# Patient Record
Sex: FEMALE | Race: BLACK OR AFRICAN AMERICAN | NOT HISPANIC OR LATINO | Employment: UNEMPLOYED | URBAN - METROPOLITAN AREA
[De-identification: names, ages, dates, MRNs, and addresses within clinical notes are randomized per-mention and may not be internally consistent; named-entity substitution may affect disease eponyms.]

---

## 2017-01-24 ENCOUNTER — HISTORICAL (OUTPATIENT)
Dept: RADIOLOGY | Facility: HOSPITAL | Age: 32
End: 2017-01-24

## 2018-05-22 ENCOUNTER — HISTORICAL (OUTPATIENT)
Dept: ADMINISTRATIVE | Facility: HOSPITAL | Age: 33
End: 2018-05-22

## 2018-05-23 ENCOUNTER — HISTORICAL (OUTPATIENT)
Dept: ADMINISTRATIVE | Facility: HOSPITAL | Age: 33
End: 2018-05-23

## 2018-10-07 ENCOUNTER — HOSPITAL ENCOUNTER (OUTPATIENT)
Dept: OBSTETRICS AND GYNECOLOGY | Facility: HOSPITAL | Age: 33
End: 2018-10-08
Attending: OBSTETRICS & GYNECOLOGY | Admitting: OBSTETRICS & GYNECOLOGY

## 2018-10-07 LAB — FINAL CULTURE: NORMAL

## 2018-10-29 ENCOUNTER — HISTORICAL (OUTPATIENT)
Dept: LAB | Facility: HOSPITAL | Age: 33
End: 2018-10-29

## 2018-11-28 ENCOUNTER — HOSPITAL ENCOUNTER (OUTPATIENT)
Dept: OBSTETRICS AND GYNECOLOGY | Facility: HOSPITAL | Age: 33
End: 2018-11-28
Attending: OBSTETRICS & GYNECOLOGY | Admitting: OBSTETRICS & GYNECOLOGY

## 2018-12-04 ENCOUNTER — HISTORICAL (OUTPATIENT)
Dept: LAB | Facility: HOSPITAL | Age: 33
End: 2018-12-04

## 2018-12-13 ENCOUNTER — HISTORICAL (OUTPATIENT)
Dept: LAB | Facility: HOSPITAL | Age: 33
End: 2018-12-13

## 2018-12-27 ENCOUNTER — HISTORICAL (OUTPATIENT)
Dept: LAB | Facility: HOSPITAL | Age: 33
End: 2018-12-27

## 2018-12-29 LAB — FINAL CULTURE: NORMAL

## 2019-02-14 ENCOUNTER — HOSPITAL ENCOUNTER (OUTPATIENT)
Dept: OBSTETRICS AND GYNECOLOGY | Facility: HOSPITAL | Age: 34
End: 2019-02-14
Attending: INTERNAL MEDICINE | Admitting: INTERNAL MEDICINE

## 2019-02-14 LAB
ABS NEUT (OLG): 10.4 X10(3)/MCL (ref 2.1–9.2)
ALBUMIN SERPL-MCNC: 2.5 GM/DL (ref 3.4–5)
ALBUMIN/GLOB SERPL: 0.6 {RATIO}
ALP SERPL-CCNC: 132 UNIT/L (ref 38–126)
ALT SERPL-CCNC: 25 UNIT/L (ref 12–78)
APPEARANCE, UA: CLEAR
AST SERPL-CCNC: 20 UNIT/L (ref 15–37)
BACTERIA #/AREA URNS AUTO: ABNORMAL /HPF
BASOPHILS # BLD AUTO: 0 X10(3)/MCL (ref 0–0.2)
BASOPHILS NFR BLD AUTO: 0 %
BILIRUB SERPL-MCNC: 0.2 MG/DL (ref 0.2–1)
BILIRUB UR QL STRIP: NEGATIVE
BILIRUBIN DIRECT+TOT PNL SERPL-MCNC: 0.1 MG/DL (ref 0–0.2)
BILIRUBIN DIRECT+TOT PNL SERPL-MCNC: 0.1 MG/DL (ref 0–0.8)
BUN SERPL-MCNC: 6 MG/DL (ref 7–18)
CALCIUM SERPL-MCNC: 8.2 MG/DL (ref 8.5–10.1)
CHLORIDE SERPL-SCNC: 105 MMOL/L (ref 98–107)
CO2 SERPL-SCNC: 20 MMOL/L (ref 21–32)
COLOR UR: ABNORMAL
CREAT SERPL-MCNC: 0.58 MG/DL (ref 0.55–1.02)
EOSINOPHIL # BLD AUTO: 0.1 X10(3)/MCL (ref 0–0.9)
EOSINOPHIL NFR BLD AUTO: 1 %
ERYTHROCYTE [DISTWIDTH] IN BLOOD BY AUTOMATED COUNT: 13.7 % (ref 11.5–17)
GLOBULIN SER-MCNC: 4.3 GM/DL (ref 2.4–3.5)
GLUCOSE (UA): NEGATIVE
GLUCOSE SERPL-MCNC: 78 MG/DL (ref 74–106)
HCT VFR BLD AUTO: 33.4 % (ref 37–47)
HGB BLD-MCNC: 10.9 GM/DL (ref 12–16)
HGB UR QL STRIP: NEGATIVE
KETONES UR QL STRIP: ABNORMAL
LDH SERPL-CCNC: 153 UNIT/L (ref 84–246)
LEUKOCYTE ESTERASE UR QL STRIP: NEGATIVE
LYMPHOCYTES # BLD AUTO: 1.5 X10(3)/MCL (ref 0.6–4.6)
LYMPHOCYTES NFR BLD AUTO: 11 %
MCH RBC QN AUTO: 30.8 PG (ref 27–31)
MCHC RBC AUTO-ENTMCNC: 32.6 GM/DL (ref 33–36)
MCV RBC AUTO: 94.4 FL (ref 80–94)
MONOCYTES # BLD AUTO: 0.9 X10(3)/MCL (ref 0.1–1.3)
MONOCYTES NFR BLD AUTO: 7 %
NEUTROPHILS # BLD AUTO: 10.4 X10(3)/MCL (ref 2.1–9.2)
NEUTROPHILS NFR BLD AUTO: 79 %
NITRITE UR QL STRIP.AUTO: NEGATIVE
PH UR STRIP: 6 [PH] (ref 5–9)
PLATELET # BLD AUTO: 167 X10(3)/MCL (ref 130–400)
PMV BLD AUTO: 10.2 FL (ref 9.4–12.4)
POTASSIUM SERPL-SCNC: 3.7 MMOL/L (ref 3.5–5.1)
PROT SERPL-MCNC: 6.8 GM/DL (ref 6.4–8.2)
PROT UR QL STRIP: ABNORMAL
RBC # BLD AUTO: 3.54 X10(6)/MCL (ref 4.2–5.4)
RBC #/AREA URNS HPF: ABNORMAL /[HPF]
SODIUM SERPL-SCNC: 134 MMOL/L (ref 136–145)
SP GR UR STRIP: 1.03 (ref 1–1.03)
SQUAMOUS EPITHELIAL, UA: ABNORMAL
URATE SERPL-MCNC: 3.2 MG/DL (ref 2.6–7.2)
UROBILINOGEN UR STRIP-ACNC: 1
WBC # SPEC AUTO: 13.2 X10(3)/MCL (ref 4.5–11.5)
WBC #/AREA URNS AUTO: 5 /HPF (ref 0–3)

## 2019-02-18 ENCOUNTER — HISTORICAL (OUTPATIENT)
Dept: LAB | Facility: HOSPITAL | Age: 34
End: 2019-02-18

## 2019-02-18 LAB
CREAT SERPL-MCNC: 0.65 MG/DL (ref 0.7–1.3)
CREAT SERPL-MCNC: 0.65 MG/DL (ref 0.7–1.3)
CREAT UR-MCNC: 102.7 MG/DL
PROT 24H UR-MCNC: 228 MG/24HR (ref 60–100)

## 2019-10-10 ENCOUNTER — HISTORICAL (OUTPATIENT)
Dept: CARDIOLOGY | Facility: HOSPITAL | Age: 34
End: 2019-10-10

## 2022-03-15 ENCOUNTER — HISTORICAL (OUTPATIENT)
Dept: RADIOLOGY | Facility: HOSPITAL | Age: 37
End: 2022-03-15

## 2022-03-15 ENCOUNTER — HISTORICAL (OUTPATIENT)
Dept: ADMINISTRATIVE | Facility: HOSPITAL | Age: 37
End: 2022-03-15

## 2022-04-09 ENCOUNTER — HISTORICAL (OUTPATIENT)
Dept: ADMINISTRATIVE | Facility: HOSPITAL | Age: 37
End: 2022-04-09

## 2022-04-25 VITALS — HEIGHT: 67 IN | WEIGHT: 174.81 LBS | BODY MASS INDEX: 27.44 KG/M2

## 2022-04-30 NOTE — H&P
HISTORY OF PRESENT ILLNESS:  Ms. Blackburn is a 33-year-old  7, para 3-0-3-3, with an intrauterine pregnancy at 12 weeks.  The patient's pregnancy is complicated by the patient stating that she decided she did not want to be pregnant in September.  She took Cytotec multiple times and states that she had some left over from previous miscarriage management.  She does not remember the physician who wrote her for the medicine or has the prescription bottle, but she states she took an undisclosed amount of Cytotec knowing that it could help end the pregnancy; she states she read on the Internet and from friends.  Currently, she arrives to [facility] today with diffuse abdominal pain where she is found to have a viable pregnancy at 12 weeks and 1 day with a fetal heart rate of 150 and a subchorionic hemorrhage.  The patient will be admitted for observation and pain management.  She denies vaginal bleeding at this time, leakage of fluid, fevers, chills, constipation, diarrhea.    OBSTETRICAL HISTORY:  Notable for 3 full-term vaginal deliveries, 3 miscarriages.  She states they were all complete on their own.    SOCIAL HISTORY:  She is single mom, 3 children.  Does not do illicit drugs or tobacco.    PAST MEDICAL HISTORY:  She denies.    ALLERGIES:  She is allergic to NSAIDs.    GYNECOLOGICAL HISTORY:  She states ovarian cysts history in the past.    MEDICATIONS:  She takes none.    REVIEW OF SYSTEMS:  The patient denies a headache, visual changes, chest pain, shortness of breath, nausea, vomiting, fever and chills.  She states she feels pregnant.  Reports abdominal pain and contractions and pressure.    PHYSICAL EXAMINATION:  VITAL SIGNS:  Reviewed.  Blood pressure is 102/65, initial was 89/54, pulse is in the 70s.  She is saturating 100% in room air.   GENERAL:  She is alert and oriented x3.   ABDOMEN:  Soft, nontender and nondistended.  No guarding.  No rebound.     EXTREMITIES:  Nontender.      Ultrasound shows a single live pregnancy at 12 weeks 1 day with a heart rate 150.  Subchorionic hemorrhage of 2.8 x 1 point, cm.  Possible placenta previa.  It is only 12 weeks, however.    LABORATORY DATA:  Reviewed.  CMP is within normal limits.  Her beta quant is 51,000.  Her H and H are 12.9 and 39.3.  Urine tox is positive for benzodiazepines in the past as well as opiates.  The patient states she never did drugs.    ASSESSMENT AND PLAN:  This is a 33-year-old  7, para 3-0-3-3, with an intrauterine pregnancy at 12 weeks.  The patient tried to take an abortifacient knowing it was an abortifacient early in the pregnancy.  She states at that time she did not want to be pregnant.  However, she understands that today her pregnancy is viable.  She states that today she is not sure if she wants to keep her pregnancy or not.  I explained to Ms. Blackburn that we do not perform abortions here at [facility] and that I do not even know where she would have to go.  I am sure she can Google it like she Googled her Cytotec information.  I also explained to her that if she thinks she wants to keep the baby, my recommendation at this time would be to put her on progesterone therapy.  We will get  consultation.  I also offered her pastoral care.  The patient verbalized understanding to this and states she would like a  to come talk to her and at this time, she would like to treat it like a viable pregnancy.  We will also get a U-tox for her history.  Her last Pap smear was in , performed by Dr. Baldwin [facility], it was normal.        ______________________________  MD ROGER Butt/MALENA  DD:  10/07/2018  Time:  02:36PM  DT:  10/07/2018  Time:  03:06PM  Job #:  [id number]    The H&P was reviewed, the patient was examined, and the following changes to the patients condition are  noted:  ______________________________________________________________________________  ______________________________________________________________________________  ______________________________________________________________________________  [  ] No changes to the patient's condition:      ______________________________                                             ___________________  PHYSICIAN SIGNATURE                                                             DATE/TIME

## 2022-04-30 NOTE — ED PROVIDER NOTES
Patient:   Linda Blackburn            MRN: [id number]            FIN: [id number]               Age:   33 years     Sex:  Female     :  1985   Associated Diagnoses:   Left upper quadrant pain; Subchorionic hematoma; Threatened    Author:   Beena Apple MD      Basic Information   Time seen: Date & time 10/7/2018 09:50:00.   History source: Patient, EMS.   Arrival mode: Ambulance.   History limitation: None.   Additional information: Chief Complaint from Nursing Triage Note : Chief Complaint   10/7/2018 9:48 CDT       Chief Complaint           reports vaginal spotting, blood clots, and abd pain. approx 2-3 months pregnant. hypotensive in route, bp 89/54. reports firmness to abdomen.  (Modified) .      History of Present Illness   The patient presents with vaginal bleeding.  The onset was just prior to arrival.  The course/duration of symptoms is constant.  Character of pain: sharp, location: diffuse degree: severe.  Bleeding: moderate passing clots.  Pregnancy Status : 7, Para: 3, 12 weeks LMP 2018.  The exacerbating factor is none.  The relieving factor is none.  Risk factors consist of Hx prior ectopic.  Prior episodes: ectopic pregnancy.  Therapy today: emergency medical services.  Associated symptoms: abdominal pain, denies nausea and denies vomiting.        Review of Systems   Constitutional symptoms:  No fever, no chills.    Skin symptoms:  No jaundice, no rash.    Eye symptoms:  Vision unchanged, No blurred vision,    Respiratory symptoms:  Orthopnea, no shortness of breath, no cough.    Cardiovascular symptoms:  Diaphoresis, no chest pain, no palpitations.    Gastrointestinal symptoms:  Abdominal pain, no nausea, no vomiting, no diarrhea, no constipation.    Genitourinary symptoms:  Vaginal bleeding, no dysuria, no hematuria.    Neurologic symptoms:  No headache, no dizziness.    Hematologic/Lymphatic symptoms:  Bleeding tendency negative,    Allergy/immunologic  symptoms:  No impaired immunity,              Additional review of systems information: All other systems reviewed and otherwise negative.      Health Status   Allergies:    Allergic Reactions (Selected)  Severity Not Documented  NSAIDs- No reactions were documented..   Medications:  (Selected)   Prescriptions  Prescribed  Zofran 4 mg oral tablet: 4 mg = 1 tab(s), Oral, q8hr, PRN PRN as needed for nausea/vomiting, # 15 tab(s), 0 Refill(s)  Documented Medications  Documented  ACETAMINOPHEN/COD ELIXIR HI:   ALPRAZOLAM 1 MG TABLET: 1 mg = 1 tab(s), Oral, TID  AMITRIPTYLINE HCL 25 MG TAB: 25 mg = 1 tab(s), Oral, qPM  AMOX/K CLAV  -125: = 1 tab(s), Oral, BID  AMOXICILLIN 500 MG CAPS: 500 mg = 1 cap(s), Oral, TID  APAP #3 TABLET:   BENZONATATE 100 MG CAPS:   CHLORHEXIDINE GLUC 0.12% RI:   CITALOPRAM HBR 40 MG TABLET: 40 mg = 1 tab(s), Oral, Daily  GUANFACINE 2MG TABLET: 2 mg = 1 tab(s), Oral, BID  HYDROCO/APAP TAB 5-325MG:    MG TABS:   LORATADINE 10 MG TABLET: 10 mg = 1 tab(s), Oral, Daily  SUMATRIPTAN SUCC 100 MG TAB:   TIZANIDINE 4MG TABLET: 4 mg = 1 tab(s), Oral, BID  TRAMADOL HCL 50 MG TABLET: 50 mg = 1 tab(s), Oral, BID  TRAZODONE 150MG TABLET MPC: 150 mg = 1 tab(s), Oral, qPM  ZOLPIDEM TART 5MG TABLET: 5 mg = 1 tab(s), Oral, qPM.      Past Medical/ Family/ Social History   Medical history:    Active  Scoliosis ([id number])  Chronic back pain ([id number])  Anxiety ([id number])  Depression ([id number])  Migraine (59175475)  Bipolar (473029273)  Anemia (925080854)  Resolved  Preeclampsia ([id number]):  Resolved., Reviewed as documented in chart.   Surgical history:    none., Reviewed as documented in chart.   Family history:    Hypertension.  Father  Mother  , Reviewed as documented in chart.   Social history:    Social & Psychosocial Habits    Alcohol  05/02/2014 Risk Assessment: Denies Alcohol Use    11/15/2016  Use: Never    Employment/School  06/24/2016  Status:  Employed    Home/Environment  11/29/2017  Lives with: Children    Living situation: Home/Independent    Substance Abuse  05/02/2014 Risk Assessment: Denies Substance Abuse    06/01/2016  Use: Never    Tobacco  05/29/2012 Risk Assessment: Denies Tobacco Use    05/15/2016  Use: Never smoker  , Reviewed as documented in chart.   Problem list:    Active Problems (7)  Anemia   Anxiety   Bipolar   Chronic back pain   Depression   Migraine   Scoliosis   .      Physical Examination               Vital Signs   Vital Signs   10/7/2018 9:48 CDT       Temperature Oral          36.5 DegC                             Temperature Oral (calculated)             97.70 DegF                             Peripheral Pulse Rate     70 bpm                             Respiratory Rate          18 br/min                             SpO2                      99 %                             Oxygen Therapy            Room air                             Systolic Blood Pressure   89 mmHg  LOW                             Diastolic Blood Pressure  54 mmHg  LOW  .      Vital Signs (last 24 hrs)_____  Last Charted___________  Temp Oral     36.5 DegC  (OCT 07 09:48)  Heart Rate Peripheral   70 bpm  (OCT 07 09:48)  Resp Rate         18 br/min  (OCT 07 09:48)  SBP      L 89mmHg  (OCT 07 09:48)  DBP      L 54mmHg  (OCT 07 09:48)  SpO2      99 %  (OCT 07 09:48)  .   Measurements   10/7/2018 9:48 CDT       Weight Dosing             83 kg                             Weight Measured and Calculated in Lbs     182.98 lb                             Weight Estimated          83 kg                             Height/Length Dosing      165 cm                             Height/Length Estimated   165 cm                             Body Mass Index Estimated 30.49 kg/m2  .   Basic Oxygen Information   10/7/2018 9:48 CDT       SpO2                      99 %                             Oxygen Therapy            Room air  .   General:  Alert, no acute distress.    Skin:   Warm, dry, normal for ethnicity.    Head:  Normocephalic, atraumatic.    Neck:  Supple, trachea midline.    Eye:  Normal conjunctiva.   Ears, nose, mouth and throat:  Oral mucosa moist.   Cardiovascular:  Regular rate and rhythm, Normal peripheral perfusion, No edema.    Respiratory:  Respirations are non-labored.   Gastrointestinal:  Soft, Non distended, Normal bowel sounds, Tenderness: Moderate, generalized, Guarding: Negative.    Genitourinary:  External genitalia: Normal, Bimanual exam: Cervix closed, small amount of bleeding, no clots, no tissue.    Neurological:  Alert and oriented to person, place, time, and situation, No focal neurological deficit observed.    Psychiatric:  Cooperative.      Medical Decision Making   Rationale:  Patient with vaginal bleeding in the context of home positive pregnancy tests.  Had not seen any ObGyn provider for this pregnancy yet.    Exam is rather unremarkable; however, ultrasound demonstrates a viable IUP with probable subchorionic hemorrhage, possible placenta previa.  Upon further discussion with the patient, it appears that she had taken home left over Cytotec from a prior miscarriage in an attempt to abort this pregnancy without medical guidance on this instance.  She is still complaining of severe pain requiring parenteral narcotics.  I discussed with ObGyn on call regarding admission for observation, serial H&H overnight and repeat abdominal exams.  Dr. Schmid has kindly agreed to admit the patient..   Documents reviewed:  Emergency department nurses' notes.   Orders  Launch Order Profile (Selected)   Inpatient Orders  InProcess (In Process)  CMP: STAT collect, 10/07/18 10:21:26 CDT, BLOOD, Collected, Stop date 10/07/18 10:21:00 CDT, Lab Collect  Ordered  Discharge Planning Ongoing Assessment: 10/10/18 9:00:00 CDT, q3day  Ordered (Collected)  POC ISTAT Chem8 Request:: BLOOD, STAT collect, Collected, 10/07/18 10:21:26 CDT, Stop date 10/07/18 10:21:00 CDT, Lab Collect,  Print Label By Order Location  Ordered (Dispatched)  Pregnancy Test: Stat collect, 10/07/18 10:47:00 CDT, Blood, Stop date 10/07/18 10:47:00 CDT, Lab Collect, Print Label By Order Location, 10/07/18 10:47:00 CDT  Ordered (Exam Completed)  US OB 1st Trimester: Stat, 10/07/18 10:29:00 CDT, Vaginal Bleeding, None, Stretcher, Rad Type, Schedule this test, 10/07/18 10:29:00 CDT  Ordered (In-Lab)  hCG Quantitative: STAT collect, 10/07/18 10:24:26 CDT, BLOOD, Collected, Stop date 10/07/18 10:24:00 CDT, Lab Collect  Canceled  Type and rh: STAT collect, 10/07/18 10:21:26 CDT, BLOOD, Collected, Stop date 10/07/18 10:21:00 CDT, Lab Collect  Completed  Automated Diff: STAT collect, 10/07/18 10:21:00 CDT, Blood, Collected, Once, Stop date 10/07/18 10:21:00 CDT, Lab Collect, Print Label By Order Location, 10/07/18 10:03:00 CDT  CBC w/ Auto Diff: STAT collect, 10/07/18 10:21:26 CDT, BLOOD, Collected, Stop date 10/07/18 10:21:00 CDT, Lab Collect  [id number]: 2,000 mL, 2,000 mL, IV, start date 10/07/18 10:29:00 CDT, stop date 10/07/18 10:29:00 CDT, STAT  PT: STAT collect, 10/07/18 10:21:26 CDT, BLOOD, Collected, Stop date 10/07/18 10:21:00 CDT, Lab Collect  PTT: STAT collect, 10/07/18 10:21:26 CDT, BLOOD, Collected, Stop date 10/07/18 10:21:00 CDT, Lab Collect  Zofran 2 mg/mL injectable solution: 4 mg, form: Injection, IV Push, Once, first dose 10/07/18 10:35:00 CDT, stop date 10/07/18 10:35:00 CDT, STAT  fentanyl IV/IM/SubQ: 25 mcg, form: Injection, IV, Once, first dose 10/07/18 10:35:00 CDT, stop date 10/07/18 10:35:00 CDT, STAT  Discontinued  Pregnancy Test Urine: Stat collect, Urine, 10/07/18 10:21:00 CDT, Stop date 10/07/18 10:22:00 CDT, Nurse collect, Print Label By Order Location, 10/07/18 10:21:00 CDT  Type and Ab Screen: 10/07/18 10:21:00 CDT, Stat collect, Blood, Lab Collect, Packed RBC, To Keep Ahead, 2, 10/07/18, Print Label By Order Location, 10/07/18 10:21:00 CDT.   Results review:  Lab results : Lab View   10/7/2018 10:21  CDT      Sodium Lvl                135 mmol/L  LOW                             Potassium Lvl             3.7 mmol/L                             Chloride                  103 mmol/L                             CO2                       22.0 mmol/L                             Calcium Lvl               8.7 mg/dL                             Glucose Lvl               98 mg/dL                             BUN                       7.0 mg/dL                             Albumin Lvl               3.00 gm/dL  LOW                             PT                        13.6 second(s)                             INR                       1.01                             PTT                       29.7 second(s)                             WBC                       9.7 x10(3)/mcL                             RBC                       4.13 x10(6)/mcL  LOW                             Hgb                       12.9 gm/dL                             Hct                       39.3 %                             Platelet                  265 x10(3)/mcL                             MCV                       95.2 fL  HI                             MCH                       31.2 pg  HI                             MCHC                      32.8 gm/dL  LOW                             RDW                       13.0 %                             MPV                       9.6 fL                             Abs Neut                  6.86 x10(3)/mcL                             Neutro Auto               71 %  NA                             Lymph Auto                21 %  NA                             Mono Auto                 7 %  NA                             Eos Auto                  1 %  NA                             Abs Eos                   0.1 x10(3)/mcL                             Basophil Auto             0 %  NA                             Abs Neutro                6.86 x10(3)/mcL                             Abs Lymph                 2.0  x10(3)/mcL                             Abs Mono                  0.7 x10(3)/mcL                             Abs Baso                  0.0 x10(3)/mcL  ,    No qualifying data available, Interpretation Labs unremarkable.    Radiology results:  Ultrasound, pelvis, interpretation:  * Final Report *    Reason For Exam  Vaginal Bleeding    Radiology Report  Comparison: No study for comparison     INDICATION: 12 weeks pregnant with vaginal bleeding.     TECHNIQUE: Real-time transabdominal sonographic imaging of the uterus  was performed using a 2.8 MHz transducer.     FINDINGS:     A single live intrauterine fetus is present and demonstrates an  estimated gestational age of 12 weeks and 1 day. Fetal heart rate is  estimated at 148 bpm.     A 2.8 cm x 1.0 cm crescentic subchorionic hypoechoic structure is  present and is favored to represent a subchorionic hemorrhage. The  placenta was not well demonstrated with respect to the cervix however  it does appear to be either in close proximity to the internal os or  overlying it.     The right ovary is normal in size and echogenicity and measures 3.3 cm  x 2.0 cm. It contains a 1.8 cm cystic structure favored to represent a  corpus luteum. The left ovary is normal in size and echogenicity and  measures 3 cm x 1.5 cm. Flow is present in both ovaries.     IMPRESSION:     Single live intrauterine fetus with an estimated gestational age of 12  weeks and 1 day. Fetal heart rate is estimated at 150 bpm.     2.8 cm x 1.0 cm crescentic hypoechoic structure adjacent to the  gestational sac favored to represent a subchorionic hemorrhage     Possible placenta previa. Short-term follow-up ultrasonography  recommended.       Signature Line  Electronically Signed By: Jose Alberto HERNÁNDEZ, Charanjit Terry  Date/Time Signed: 10/07/2018 11:58  .       Impression and Plan   Diagnosis   Left upper quadrant pain (VUF13-JA R10.12)   Subchorionic hematoma (NKC33-OM O41.8X90)   Threatened  (IVN87-XH  O20.0)   Plan   Condition: Improved.    Disposition: Admit time  10/7/2018 13:03:00, Binu HERNÁNDEZ, Ruslan Shrestha.    Counseled: Patient, Regarding diagnosis, Regarding diagnostic results, Regarding treatment plan, Patient indicated understanding of instructions.

## 2022-05-02 NOTE — HISTORICAL [facility] CERNER
This is a historical note converted from Anne. Formatting and pictures may have been removed.  Please reference Anne for original formatting and attached multimedia. Chief Complaint  Referral for Rt Ankle Fx.  History of Present Illness  Patient is here for evaluation of right ankle. ?She twisted her ankle 2 months ago. ?She?comes in today and regular shoes with some continued pain over lateral mall. ?She denies other injuries.  Review of Systems  Constitutional: No fevers, no chills, no night sweats,  Respiratory: Normal work of breathing  Cardiovascular: No chest pains  Gastrointestinal: no nausea, no vomiting  Musculoskeletal: See HPI  Integumentary: No rashes  Neurologic: Normal, atraumatic  Physical Exam  Vitals & Measurements  HT:?169?cm? HT:?169?cm? WT:?79.3?kg? WT:?79.3?kg? BMI:?27.77?  General: A+Ox3, NAD  Neck: No JVD  Respiratory: Normal work of breathing, Symmetric chest rise?  Cardiovascular: Regular rate and rhythm at radial pulse  Gastrointestinal: Soft, nontender, nondistended  Neurologic: CN II-XII intact  Musculoskeletal:  ?  RLE  Tenderness palpation over lateral mall, tenderness palpation over the AITF L  Neurovascular intact distally  Full range of motion  No wounds  No other areas of tenderness palpation  Assessment/Plan  Fracture of right ankle  Ordered:  Clinic Follow up, *Est. 07/04/18 3:00:00 CDT, Order for future visit, Fracture of right ankle, [facility]  Clinic Follow up, *Est. 06/20/18 3:00:00 CDT, Order for future visit, Fracture of right ankle, [facility]  XR Ankle Right Minimum 3 Views, Routine, *Est. 06/20/18 3:00:00 CDT, Follow Up Trauma, None, Ambulatory, Rad Type, Order for future visit, Fracture of right ankle, *Est. 06/20/18 3:00:00 CDT  ?  Patient has 2 months out?incompletely healed lateral malleolus fracture. ?At this time we will place her into a Cam boot and she will be weightbearing as tolerated we will see her back in about 6 weeks for repeat x-ray to  make sure this is healing at that time if it is not we will likely get her a bone stimulator.   Problem List/Past Medical History  Ongoing  Anemia  Anxiety  Bipolar  Chronic back pain  Depression  Migraine  Scoliosis  Historical  Preeclampsia  Procedure/Surgical History  Application of short leg splint (calf to foot) (03/22/2018), Immobilization of Right Lower Leg using Splint (03/22/2018), none.  Medications  ACETAMINOPHEN/COD ELIXIR HI,? ?Not taking  ALPRAZOLAM 1 MG TABLET, 1 mg= 1 tab(s), Oral, TID  AMOX/K CLAV -125, 1 tab(s), Oral, BID,? ?Not Taking, Completed Rx  AMOXICILLIN 500 MG CAPS, 500 mg= 1 cap(s), Oral, TID,? ?Not Taking, Completed Rx  APAP #3 TABLET,? ?Not taking  BENZONATATE 100 MG CAPS,? ?Not Taking, Completed Rx  CHLORHEXIDINE GLUC 0.12% RI,? ?Not taking  CITALOPRAM HBR 40 MG TABLET, 40 mg= 1 tab(s), Oral, Daily,? ?Not taking  GUANFACINE 2MG TABLET, 2 mg= 1 tab(s), Oral, BID,? ?Not taking  HYDROCO/APAP TAB 5-325MG,? ?Not taking   MG TABS,? ?Not taking  LORATADINE 10 MG TABLET, 10 mg= 1 tab(s), Oral, Daily,? ?Not taking  SUMATRIPTAN SUCC 100 MG TAB,? ?Not taking  TIZANIDINE 4MG TABLET, 4 mg= 1 tab(s), Oral, BID  TRAMADOL HCL 50 MG TABLET, 50 mg= 1 tab(s), Oral, BID,? ?Not taking  TRAZODONE 150MG TABLET MPC, 150 mg= 1 tab(s), Oral, qPM,? ?Not taking  ZOLPIDEM TART 5MG TABLET, 5 mg= 1 tab(s), Oral, qPM  Allergies  NSAIDs  Social History  Alcohol - Denies Alcohol Use, 05/02/2014  Never, 11/15/2016  Employment/School  Employed, 06/24/2016  Home/Environment  Lives with Children. Living situation: Home/Independent., 11/29/2017  Substance Abuse - Denies Substance Abuse, 05/02/2014  Never, 06/01/2016  Tobacco - Denies Tobacco Use, 05/29/2012  Never smoker, 05/15/2016  Family History  Hypertension.: Mother and Father.  Immunizations  Vaccine Date Status   tetanus-diphtheria toxoids 11/24/2012 Given   Diagnostic Results  Stable lateral mall ankle fracture. ?Maintained mortise on stress views.  ?Callus formation present. ?Fracture line still quite evident.      Linda Blackburn?s?medical history, physical exam findings right ankle, diagnosis, and treatment outlined by??Nancy?has been reviewed.??Treatment plan is determined to be reasonable and appropriate.?I was present during the evaluation. X-rays right ankle?reviewed.  ?

## 2022-07-12 ENCOUNTER — LAB VISIT (OUTPATIENT)
Dept: LAB | Facility: HOSPITAL | Age: 37
End: 2022-07-12
Attending: PSYCHIATRY & NEUROLOGY
Payer: MEDICAID

## 2022-07-12 DIAGNOSIS — R53.1 WEAKNESS: ICD-10-CM

## 2022-07-12 DIAGNOSIS — E53.9 VITAMIN B DEFICIENCY: ICD-10-CM

## 2022-07-12 DIAGNOSIS — E03.9 HYPOTHYROIDISM, UNSPECIFIED TYPE: ICD-10-CM

## 2022-07-12 DIAGNOSIS — G04.90 ENCEPHALITIS: ICD-10-CM

## 2022-07-12 DIAGNOSIS — E72.20 CONGENITAL HYPERAMMONEMIA, TYPE I: ICD-10-CM

## 2022-07-12 DIAGNOSIS — G40.909 EPILEPSY: ICD-10-CM

## 2022-07-12 DIAGNOSIS — G25.9 MOVEMENT DISORDER: ICD-10-CM

## 2022-07-12 DIAGNOSIS — M19.90 ARTHRITIS: ICD-10-CM

## 2022-07-12 DIAGNOSIS — E54 VITAMIN C DEFICIENCY: ICD-10-CM

## 2022-07-12 LAB
ALBUMIN SERPL-MCNC: 3.6 GM/DL (ref 3.5–5)
ALBUMIN/GLOB SERPL: 1 RATIO (ref 1.1–2)
ALP SERPL-CCNC: 142 UNIT/L (ref 40–150)
ALT SERPL-CCNC: 26 UNIT/L (ref 0–55)
AST SERPL-CCNC: 24 UNIT/L (ref 5–34)
BILIRUBIN DIRECT+TOT PNL SERPL-MCNC: 0.3 MG/DL
BUN SERPL-MCNC: 6.9 MG/DL (ref 7–18.7)
CALCIUM SERPL-MCNC: 9.7 MG/DL (ref 8.4–10.2)
CHLORIDE SERPL-SCNC: 104 MMOL/L (ref 98–107)
CO2 SERPL-SCNC: 22 MMOL/L (ref 22–29)
CREAT SERPL-MCNC: 0.76 MG/DL (ref 0.55–1.02)
CRP SERPL HS-MCNC: 9 MG/L
DEPRECATED CALCIDIOL+CALCIFEROL SERPL-MC: 7.7 NG/ML (ref 30–80)
ERYTHROCYTE [DISTWIDTH] IN BLOOD BY AUTOMATED COUNT: 14.5 % (ref 11.5–17)
ERYTHROCYTE [SEDIMENTATION RATE] IN BLOOD: 43 MM/HR (ref 0–20)
FOLATE SERPL-MCNC: 55.9 NG/ML (ref 7–31.4)
GLOBULIN SER-MCNC: 3.6 GM/DL (ref 2.4–3.5)
GLUCOSE SERPL-MCNC: 114 MG/DL (ref 74–100)
HCT VFR BLD AUTO: 45.3 % (ref 37–47)
HGB BLD-MCNC: 14.7 GM/DL (ref 12–16)
IRON SERPL-MCNC: 178 UG/DL (ref 50–170)
MCH RBC QN AUTO: 30.8 PG (ref 27–31)
MCHC RBC AUTO-ENTMCNC: 32.5 MG/DL (ref 33–36)
MCV RBC AUTO: 94.8 FL (ref 80–94)
NRBC BLD AUTO-RTO: 0 %
PLATELET # BLD AUTO: 269 X10(3)/MCL (ref 130–400)
PMV BLD AUTO: 9.9 FL (ref 7.4–10.4)
POTASSIUM SERPL-SCNC: 3.9 MMOL/L (ref 3.5–5.1)
PROT SERPL-MCNC: 7.2 GM/DL (ref 6.4–8.3)
RBC # BLD AUTO: 4.78 X10(6)/MCL (ref 4.2–5.4)
RHEUMATOID FACT SERPL-ACNC: <13 IU/ML
SODIUM SERPL-SCNC: 138 MMOL/L (ref 136–145)
T4 SERPL-MCNC: 6.35 UG/DL (ref 4.87–11.72)
TSH SERPL-ACNC: 2.55 UIU/ML (ref 0.35–4.94)
VIT B12 SERPL-MCNC: 425 PG/ML (ref 213–816)
WBC # SPEC AUTO: 9 X10(3)/MCL (ref 4.5–11.5)

## 2022-07-12 PROCEDURE — 82607 VITAMIN B-12: CPT

## 2022-07-12 PROCEDURE — 84443 ASSAY THYROID STIM HORMONE: CPT

## 2022-07-12 PROCEDURE — 84207 ASSAY OF VITAMIN B-6: CPT

## 2022-07-12 PROCEDURE — 86376 MICROSOMAL ANTIBODY EACH: CPT

## 2022-07-12 PROCEDURE — 85651 RBC SED RATE NONAUTOMATED: CPT

## 2022-07-12 PROCEDURE — 83540 ASSAY OF IRON: CPT

## 2022-07-12 PROCEDURE — 82525 ASSAY OF COPPER: CPT

## 2022-07-12 PROCEDURE — 80177 DRUG SCRN QUAN LEVETIRACETAM: CPT

## 2022-07-12 PROCEDURE — 80053 COMPREHEN METABOLIC PANEL: CPT

## 2022-07-12 PROCEDURE — 82306 VITAMIN D 25 HYDROXY: CPT

## 2022-07-12 PROCEDURE — 84436 ASSAY OF TOTAL THYROXINE: CPT

## 2022-07-12 PROCEDURE — 86431 RHEUMATOID FACTOR QUANT: CPT

## 2022-07-12 PROCEDURE — 85027 COMPLETE CBC AUTOMATED: CPT

## 2022-07-12 PROCEDURE — 86141 C-REACTIVE PROTEIN HS: CPT

## 2022-07-12 PROCEDURE — 82180 ASSAY OF ASCORBIC ACID: CPT

## 2022-07-12 PROCEDURE — 86039 ANTINUCLEAR ANTIBODIES (ANA): CPT

## 2022-07-12 PROCEDURE — 36415 COLL VENOUS BLD VENIPUNCTURE: CPT

## 2022-07-12 PROCEDURE — 82746 ASSAY OF FOLIC ACID SERUM: CPT

## 2022-07-13 LAB
ANA PAT SER IF-IMP: ABNORMAL
ANA SER QL HEP2 SUBST: ABNORMAL
ANA TITR SER HEP2 SUBST: ABNORMAL {TITER}
THYROID PEROXIDASE (OLG): NEGATIVE
THYROID PEROXIDASE QUANT (OLG): NORMAL

## 2022-07-14 LAB
LEVETIRACETAM SERPL-MCNC: 31.7 MCG/ML (ref 10–40)
PYRIDOXAL PHOS SERPL-MCNC: 3 MCG/L (ref 5–50)
VIT C SERPL-MCNC: 0.2 MG/DL (ref 0.4–2)

## 2022-10-20 DIAGNOSIS — R51.0 ORTHOSTATIC HEADACHE: Primary | ICD-10-CM

## 2022-11-15 ENCOUNTER — HOSPITAL ENCOUNTER (OUTPATIENT)
Dept: RADIOLOGY | Facility: HOSPITAL | Age: 37
Discharge: HOME | End: 2022-11-15
Attending: NURSE PRACTITIONER
Payer: MEDICAID

## 2022-11-15 DIAGNOSIS — R51.0 ORTHOSTATIC HEADACHE: ICD-10-CM

## 2022-11-15 PROCEDURE — 70551 MRI BRAIN STEM W/O DYE: CPT | Mod: TC

## 2023-05-19 ENCOUNTER — APPOINTMENT (OUTPATIENT)
Dept: RADIOLOGY | Facility: HOSPITAL | Age: 38
End: 2023-05-19
Attending: FAMILY MEDICINE
Payer: MEDICAID

## 2023-05-19 ENCOUNTER — HOSPITAL ENCOUNTER (EMERGENCY)
Facility: HOSPITAL | Age: 38
Discharge: HOME | End: 2023-05-20
Attending: FAMILY MEDICINE
Payer: MEDICAID

## 2023-05-19 DIAGNOSIS — R56.9 SEIZURE: Primary | ICD-10-CM

## 2023-05-19 LAB
ALBUMIN SERPL-MCNC: 3.5 G/DL (ref 3.5–5)
ALBUMIN/GLOB SERPL: 0.9 RATIO (ref 1.1–2)
ALP SERPL-CCNC: 154 UNIT/L (ref 40–150)
ALT SERPL-CCNC: 24 UNIT/L (ref 0–55)
APPEARANCE UR: CLEAR
APTT PPP: 28.4 SECONDS (ref 23.2–33.7)
AST SERPL-CCNC: 24 UNIT/L (ref 5–34)
B-HCG SERPL QL: NEGATIVE
BACTERIA #/AREA URNS AUTO: ABNORMAL /HPF
BASOPHILS # BLD AUTO: 0.08 X10(3)/MCL
BASOPHILS NFR BLD AUTO: 0.7 %
BILIRUB UR QL STRIP.AUTO: NEGATIVE MG/DL
BILIRUBIN DIRECT+TOT PNL SERPL-MCNC: 0.2 MG/DL
BUN SERPL-MCNC: 9 MG/DL (ref 7–18.7)
CALCIUM SERPL-MCNC: 9.1 MG/DL (ref 8.4–10.2)
CHLORIDE SERPL-SCNC: 111 MMOL/L (ref 98–107)
CO2 SERPL-SCNC: 21 MMOL/L (ref 22–29)
COLOR UR: YELLOW
CREAT SERPL-MCNC: 0.71 MG/DL (ref 0.55–1.02)
EOSINOPHIL # BLD AUTO: 0.05 X10(3)/MCL (ref 0–0.9)
EOSINOPHIL NFR BLD AUTO: 0.5 %
ERYTHROCYTE [DISTWIDTH] IN BLOOD BY AUTOMATED COUNT: 13.7 % (ref 11.5–17)
ETHANOL SERPL-MCNC: <10 MG/DL
GFR SERPLBLD CREATININE-BSD FMLA CKD-EPI: >60 MLS/MIN/1.73/M2
GLOBULIN SER-MCNC: 3.7 GM/DL (ref 2.4–3.5)
GLUCOSE SERPL-MCNC: 112 MG/DL (ref 74–100)
GLUCOSE UR QL STRIP.AUTO: NEGATIVE MG/DL
HCT VFR BLD AUTO: 37.9 % (ref 37–47)
HGB BLD-MCNC: 12.5 G/DL (ref 12–16)
IMM GRANULOCYTES # BLD AUTO: 0.04 X10(3)/MCL (ref 0–0.04)
IMM GRANULOCYTES NFR BLD AUTO: 0.4 %
INR BLD: 1.01 (ref 0–1.3)
KETONES UR QL STRIP.AUTO: ABNORMAL MG/DL
LEUKOCYTE ESTERASE UR QL STRIP.AUTO: NEGATIVE UNIT/L
LYMPHOCYTES # BLD AUTO: 1.76 X10(3)/MCL (ref 0.6–4.6)
LYMPHOCYTES NFR BLD AUTO: 16.2 %
MCH RBC QN AUTO: 30.1 PG (ref 27–31)
MCHC RBC AUTO-ENTMCNC: 33 G/DL (ref 33–36)
MCV RBC AUTO: 91.3 FL (ref 80–94)
MONOCYTES # BLD AUTO: 0.47 X10(3)/MCL (ref 0.1–1.3)
MONOCYTES NFR BLD AUTO: 4.3 %
MUCOUS THREADS URNS QL MICRO: ABNORMAL /LPF
NEUTROPHILS # BLD AUTO: 8.47 X10(3)/MCL (ref 2.1–9.2)
NEUTROPHILS NFR BLD AUTO: 77.9 %
NITRITE UR QL STRIP.AUTO: NEGATIVE
PH UR STRIP.AUTO: 6 [PH]
PLATELET # BLD AUTO: 329 X10(3)/MCL (ref 130–400)
PMV BLD AUTO: 10.4 FL (ref 7.4–10.4)
POTASSIUM SERPL-SCNC: 3.5 MMOL/L (ref 3.5–5.1)
PROT SERPL-MCNC: 7.2 GM/DL (ref 6.4–8.3)
PROT UR QL STRIP.AUTO: ABNORMAL MG/DL
PROTHROMBIN TIME: 13.6 SECONDS (ref 12.5–14.5)
RBC # BLD AUTO: 4.15 X10(6)/MCL (ref 4.2–5.4)
RBC #/AREA URNS AUTO: ABNORMAL /HPF
RBC UR QL AUTO: NEGATIVE UNIT/L
SODIUM SERPL-SCNC: 143 MMOL/L (ref 136–145)
SP GR UR STRIP.AUTO: 1.02
SQUAMOUS #/AREA URNS AUTO: ABNORMAL /HPF
UROBILINOGEN UR STRIP-ACNC: 0.2 MG/DL
WBC # SPEC AUTO: 10.87 X10(3)/MCL (ref 4.5–11.5)
WBC #/AREA URNS AUTO: ABNORMAL /HPF

## 2023-05-19 PROCEDURE — 99285 EMERGENCY DEPT VISIT HI MDM: CPT | Mod: 25,27

## 2023-05-19 PROCEDURE — 80177 DRUG SCRN QUAN LEVETIRACETAM: CPT | Performed by: FAMILY MEDICINE

## 2023-05-19 PROCEDURE — 96374 THER/PROPH/DIAG INJ IV PUSH: CPT

## 2023-05-19 PROCEDURE — 80307 DRUG TEST PRSMV CHEM ANLYZR: CPT | Performed by: FAMILY MEDICINE

## 2023-05-19 PROCEDURE — 82077 ASSAY SPEC XCP UR&BREATH IA: CPT | Performed by: FAMILY MEDICINE

## 2023-05-19 PROCEDURE — 80053 COMPREHEN METABOLIC PANEL: CPT | Performed by: FAMILY MEDICINE

## 2023-05-19 PROCEDURE — 81025 URINE PREGNANCY TEST: CPT | Performed by: FAMILY MEDICINE

## 2023-05-19 PROCEDURE — 85025 COMPLETE CBC W/AUTO DIFF WBC: CPT | Performed by: FAMILY MEDICINE

## 2023-05-19 PROCEDURE — 99285 EMERGENCY DEPT VISIT HI MDM: CPT | Mod: 25

## 2023-05-19 PROCEDURE — 85730 THROMBOPLASTIN TIME PARTIAL: CPT | Performed by: FAMILY MEDICINE

## 2023-05-19 PROCEDURE — 70450 CT HEAD/BRAIN W/O DYE: CPT | Mod: TC

## 2023-05-19 PROCEDURE — 81001 URINALYSIS AUTO W/SCOPE: CPT | Performed by: FAMILY MEDICINE

## 2023-05-19 PROCEDURE — 85610 PROTHROMBIN TIME: CPT | Performed by: FAMILY MEDICINE

## 2023-05-20 VITALS
HEIGHT: 67 IN | BODY MASS INDEX: 39.24 KG/M2 | RESPIRATION RATE: 13 BRPM | HEART RATE: 88 BPM | OXYGEN SATURATION: 100 % | SYSTOLIC BLOOD PRESSURE: 129 MMHG | DIASTOLIC BLOOD PRESSURE: 92 MMHG | TEMPERATURE: 98 F | WEIGHT: 250 LBS

## 2023-05-20 LAB
AMPHET UR QL SCN: NEGATIVE
BARBITURATE SCN PRESENT UR: NEGATIVE
BENZODIAZ UR QL SCN: POSITIVE
CANNABINOIDS UR QL SCN: NEGATIVE
COCAINE UR QL SCN: NEGATIVE
FENTANYL UR QL SCN: NEGATIVE
MDMA UR QL SCN: NEGATIVE
OPIATES UR QL SCN: POSITIVE
PCP UR QL: NEGATIVE
PH UR: 6 [PH] (ref 3–11)

## 2023-05-20 PROCEDURE — 96374 THER/PROPH/DIAG INJ IV PUSH: CPT

## 2023-05-20 PROCEDURE — 63600175 PHARM REV CODE 636 W HCPCS: Performed by: FAMILY MEDICINE

## 2023-05-20 RX ORDER — LEVETIRACETAM 500 MG/5ML
1000 INJECTION, SOLUTION, CONCENTRATE INTRAVENOUS
Status: COMPLETED | OUTPATIENT
Start: 2023-05-20 | End: 2023-05-20

## 2023-05-20 RX ADMIN — LEVETIRACETAM 1000 MG: 100 INJECTION, SOLUTION INTRAVENOUS at 01:05

## 2023-05-20 NOTE — DISCHARGE INSTRUCTIONS
Follow-up primary care provider 1-2 days, return for any problems follow-up with your neurologist take your medications as prescribed

## 2023-05-20 NOTE — ED PROVIDER NOTES
Encounter Date: 5/19/2023       History     Chief Complaint   Patient presents with    Seizures     BIBA for possible seizure. Patient has hx. No one witnessed event. Patient lethargic upon arrival and unable to answer questions. Family states when they picked her up today she had been drinking alcohol     Patient was brought in for altered mental status.  Patient has a history of seizures and it is questionable whether the patient has missed any of her medication.  Upon arrival in the emergency department patient is able to state her name her birth date and that she is in the emergency room but patient is confused as to date and circumstances surrounding her current emergency room admission.  Patient denies any other complaints.  Patient does admit to missing her seizure medication.  Bystanders unsure whether patient had seizure not state it is possible    Review of patient's allergies indicates:  No Known Allergies  History reviewed. No pertinent past medical history.  No past surgical history on file.  History reviewed. No pertinent family history.     Review of Systems   HENT: Negative.     Respiratory: Negative.     Cardiovascular: Negative.    Gastrointestinal: Negative.    Neurological: Negative.    All other systems reviewed and are negative.    Physical Exam     Initial Vitals [05/19/23 1918]   BP Pulse Resp Temp SpO2   104/67 98 18 98.2 °F (36.8 °C) 96 %      MAP       --         Physical Exam    Constitutional: She appears well-developed and well-nourished.   HENT:   Head: Normocephalic and atraumatic.   Right Ear: External ear normal.   Left Ear: External ear normal.   Nose: Nose normal.   Mouth/Throat: Oropharynx is clear and moist.   Eyes: Conjunctivae and EOM are normal. Pupils are equal, round, and reactive to light.   Neck: Neck supple.   Normal range of motion.  Cardiovascular:  Normal rate, regular rhythm, normal heart sounds and intact distal pulses.           Pulmonary/Chest: Breath sounds  normal.   Abdominal: Abdomen is soft. Bowel sounds are normal.   Musculoskeletal:         General: Normal range of motion.      Cervical back: Normal range of motion and neck supple.     Neurological: She is alert. She has normal strength and normal reflexes. GCS eye subscore is 4. GCS verbal subscore is 4. GCS motor subscore is 6.   Alert and oriented x2 patient is confused.       ED Course   Procedures  Labs Reviewed   COMPREHENSIVE METABOLIC PANEL - Abnormal; Notable for the following components:       Result Value    Chloride 111 (*)     Carbon Dioxide 21 (*)     Glucose Level 112 (*)     Globulin 3.7 (*)     Albumin/Globulin Ratio 0.9 (*)     Alkaline Phosphatase 154 (*)     All other components within normal limits   URINALYSIS, REFLEX TO URINE CULTURE - Abnormal; Notable for the following components:    Protein, UA 1+ (*)     Ketones, UA Trace (*)     All other components within normal limits   DRUG SCREEN, URINE (BEAKER) - Abnormal; Notable for the following components:    Benzodiazepine, Urine Positive (*)     Opiates, Urine Positive (*)     All other components within normal limits    Narrative:     Cut off concentrations:    Amphetamines - 1000 ng/ml  Barbiturates - 200 ng/ml  Benzodiazepine - 200 ng/ml  Cannabinoids (THC) - 50 ng/ml  Cocaine - 300 ng/ml  Fentanyl - 1.0 ng/ml  MDMA - 500 ng/ml  Opiates - 300 ng/ml   Phencyclidine (PCP) - 25 ng/ml    Specimen submitted for drug analysis and tested for pH and specific gravity in order to evaluate sample integrity. Suspect tampering if specific gravity is <1.003 and/or pH is not within the range of 4.5 - 8.0  False negatives may result form substances such as bleach added to urine.  False positives may result for the presence of a substance with similar chemical structure to the drug or its metabolite.    This test provides only a PRELIMINARY analytical test result. A more specific alternate chemical method must be used in order to obtain a confirmed  analytical result. Gas chromatography/mass spectrometry (GC/MS) is the preferred confirmatory method. Other chemical confirmation methods are available. Clinical consideration and professional judgement should be applied to any drug of abuse test result, particularly when preliminary positive results are used.    Positive results will be confirmed only at the physicians request. Unconfirmed screening results are to be used only for medical purposes (treatment).        CBC WITH DIFFERENTIAL - Abnormal; Notable for the following components:    RBC 4.15 (*)     All other components within normal limits   URINALYSIS, MICROSCOPIC - Abnormal; Notable for the following components:    Mucous, UA Trace (*)     All other components within normal limits   APTT - Normal   PROTIME-INR - Normal   PREGNANCY TEST, URINE RAPID - Normal   ALCOHOL,MEDICAL (ETHANOL) - Normal   CBC W/ AUTO DIFFERENTIAL    Narrative:     The following orders were created for panel order CBC auto differential.  Procedure                               Abnormality         Status                     ---------                               -----------         ------                     CBC with Differential[[id number]]        Abnormal            Final result                 Please view results for these tests on the individual orders.   LEVETIRACETAM  (KEPPRA) LEVEL          Imaging Results              CT Head Without Contrast (Final result)  Result time 05/19/23 22:06:56      Final result by Fabian Frankel MD (05/19/23 22:06:56)                   Narrative:    EXAMINATION  CT HEAD WITHOUT CONTRAST    CLINICAL HISTORY  Mental status change, unknown cause;    TECHNIQUE  Axial non-contrast CT images of the head were acquired and multiplanar reconstructions accomplished by a CT technologist at a separate workstation, pushed to PACS for physician review.    COMPARISON  22 May 2018    FINDINGS  Images were reviewed in subdural, brain, soft tissue, and bone  windows.    Exam quality: Motion/streak artifact limits assessment of the posterior fossa.    Hemorrhage: No evidence of acute hyperattenuating blood products.    Parenchyma: No discrete mass, localized mass-effect, or CT evidence of an acute territorial cortical-based ischemic insult. Gray-white differentiation is preserved.    Midline shift: None.    CSF spaces: Normal ventricle size and configuration. No masses or expansile fluid collections.    Vasculature: No hyperdense artery identified. No abnormal densities within the dural sinuses.    Other findings: No abnormalities of the scalp or subjacent osseous structures. Mastoids are well aerated. No focal abnormality of the sella. The included facial structures are unremarkable.    IMPRESSION  No CT-evident acute intracranial abnormality.    RADIATION DOSE  Automated tube current modulation, weight-based exposure dosing, and/or iterative reconstruction technique utilized to reach lowest reasonably achievable exposure rate.    DLP: 845 mGy*cm      Electronically signed by: Fabian Frankel  Date:    05/19/2023  Time:    22:06                                     X-Ray Chest 1 View (In process)  Result time 05/19/23 21:39:33                  X-Rays:   Independently Interpreted Readings:   Other Readings:  CT Head Without Contrast  Order: [id number]  Status: Final result     Visible to patient: No (inaccessible in Patient Portal)     Next appt: None     0 Result Notes  Details      Reading Physician Reading Date Result Priority  Fabian Frankel MD  [phone] 5/19/2023 STAT    Narrative & Impression  EXAMINATION  CT HEAD WITHOUT CONTRAST     CLINICAL HISTORY  Mental status change, unknown cause;     TECHNIQUE  Axial non-contrast CT images of the head were acquired and multiplanar reconstructions accomplished by a CT technologist at a separate workstation, pushed to PACS for physician review.     COMPARISON  22 May 2018     FINDINGS  Images were reviewed in subdural,  brain, soft tissue, and bone windows.     Exam quality: Motion/streak artifact limits assessment of the posterior fossa.     Hemorrhage: No evidence of acute hyperattenuating blood products.     Parenchyma: No discrete mass, localized mass-effect, or CT evidence of an acute territorial cortical-based ischemic insult. Gray-white differentiation is preserved.     Midline shift: None.     CSF spaces: Normal ventricle size and configuration. No masses or expansile fluid collections.     Vasculature: No hyperdense artery identified. No abnormal densities within the dural sinuses.     Other findings: No abnormalities of the scalp or subjacent osseous structures. Mastoids are well aerated. No focal abnormality of the sella. The included facial structures are unremarkable.     IMPRESSION  No CT-evident acute intracranial abnormality.     RADIATION DOSE  Automated tube current modulation, weight-based exposure dosing, and/or iterative reconstruction technique utilized to reach lowest reasonably achievable exposure rate.     DLP: 845 mGy*cm        Electronically signed by: Fabian Frankel  Date:                                            05/19/2023  Time:                                           22:06      Medications   levETIRAcetam injection 1,000 mg (1,000 mg Intravenous Given 5/20/23 0118)     Medical Decision Making:   Initial Assessment:   Altered mental status/seizure  Differential Diagnosis:   Altered mental status, seizure, CVA, subdural hematoma  Clinical Tests:   Lab Tests: Ordered and Reviewed  The following lab test(s) were unremarkable: CBC, CMP and Urinalysis       <> Summary of Lab: Lab work negative for acute findings urine positive for opioids and benzodiazepine  Radiological Study: Ordered and Reviewed  ED Management:  Patient has returned to her baseline.  Patient is able ambulate around nursing station.  Mother is here to take patient home.  Patient given 1 g of Keppra.  Patient admits to not taking her  medications as prescribed social determinant.  Noncompliance with medical regimen.  Seizure disorder.  Lack of knowledge about patient's medical diagnosis                        Clinical Impression:   Final diagnoses:  [R56.9] Seizure (Primary)               Preston Romero MD  05/20/23 5748

## 2024-03-13 LAB — LEVETIRACETAM SERPL-MCNC: NORMAL UG/ML
